# Patient Record
Sex: FEMALE | Race: WHITE | NOT HISPANIC OR LATINO
[De-identification: names, ages, dates, MRNs, and addresses within clinical notes are randomized per-mention and may not be internally consistent; named-entity substitution may affect disease eponyms.]

---

## 2017-10-27 ENCOUNTER — TRANSCRIPTION ENCOUNTER (OUTPATIENT)
Age: 37
End: 2017-10-27

## 2019-12-16 ENCOUNTER — TRANSCRIPTION ENCOUNTER (OUTPATIENT)
Age: 39
End: 2019-12-16

## 2020-12-17 ENCOUNTER — EMERGENCY (EMERGENCY)
Facility: HOSPITAL | Age: 40
LOS: 1 days | End: 2020-12-17
Payer: COMMERCIAL

## 2020-12-17 VITALS
DIASTOLIC BLOOD PRESSURE: 94 MMHG | HEIGHT: 62 IN | OXYGEN SATURATION: 98 % | WEIGHT: 130.07 LBS | SYSTOLIC BLOOD PRESSURE: 135 MMHG | RESPIRATION RATE: 18 BRPM | TEMPERATURE: 98 F | HEART RATE: 96 BPM

## 2020-12-17 LAB — SARS-COV-2 RNA SPEC QL NAA+PROBE: DETECTED

## 2020-12-17 PROCEDURE — 99283 EMERGENCY DEPT VISIT LOW MDM: CPT

## 2020-12-17 PROCEDURE — U0003: CPT

## 2020-12-21 NOTE — ED PROVIDER NOTE - PATIENT PORTAL LINK FT
You can access the FollowMyHealth Patient Portal offered by [facility] by registering at the following website: [email or web address]. By joining [facility]’s FollowMyHealth portal, you will also be able to view your health information using other applications (apps) compatible with our system.

## 2020-12-21 NOTE — ED PROVIDER NOTE - CLINICAL SUMMARY MEDICAL DECISION MAKING FREE TEXT BOX
PT requesting COVID testing   will send COVID swab     PT COVID (and/or RVP) swab(s) obtained.  Advised home quarantine until test results available.  If test positive, requires home quarantine. Anticipatory guidance provided and supportive care recommended. Advised immediate return if worsening symptoms, including and not limited to sever chest pain, worsening shortness of breath, fever not reduced with OTC antipyretic use, inability to tolerate food or liquid.

## 2021-06-29 ENCOUNTER — TRANSCRIPTION ENCOUNTER (OUTPATIENT)
Age: 41
End: 2021-06-29

## 2021-08-03 ENCOUNTER — TRANSCRIPTION ENCOUNTER (OUTPATIENT)
Age: 41
End: 2021-08-03

## 2023-06-07 ENCOUNTER — NON-APPOINTMENT (OUTPATIENT)
Age: 43
End: 2023-06-07

## 2023-06-09 PROBLEM — Z00.00 ENCOUNTER FOR PREVENTIVE HEALTH EXAMINATION: Status: ACTIVE | Noted: 2023-06-09

## 2023-06-28 ENCOUNTER — APPOINTMENT (OUTPATIENT)
Dept: NEUROSURGERY | Facility: CLINIC | Age: 43
End: 2023-06-28
Payer: COMMERCIAL

## 2023-06-28 ENCOUNTER — NON-APPOINTMENT (OUTPATIENT)
Age: 43
End: 2023-06-28

## 2023-06-28 DIAGNOSIS — G93.89 OTHER SPECIFIED DISORDERS OF BRAIN: ICD-10-CM

## 2023-06-28 PROCEDURE — 99203 OFFICE O/P NEW LOW 30 MIN: CPT

## 2023-06-28 NOTE — REASON FOR VISIT
[Second Opinion] : a second opinion [Referred By: _________] : Patient was referred by [facility] [Spouse] : spouse

## 2023-06-29 PROBLEM — G93.89 BRAIN MASS: Status: ACTIVE | Noted: 2023-06-29

## 2023-06-29 NOTE — HISTORY OF PRESENT ILLNESS
[FreeTextEntry1] : newly found lesion  [de-identified] : Mrs. Aparna Jay is a 43 year old left handed female with a past medical history of  anxiety and depression, she reports daily headaches and severe migraine prior to her menstrual cycle with associated photophobia and occasional blurred vision she went to her PCP in February, an MRI was ordered which revealed a brain lesion.  She went to a neurosurgeon Dr. Matt who advised her to continue to monitor with repeat MRI.  Today she is here for a second opinion. \par \par Today she reports feeling well, working full time as an , driving and performing all activities independently. Her headaches are currently well managed with Ubrelvy and Topmax. \par \par Neuro Exam\par Intact

## 2023-06-29 NOTE — PHYSICAL EXAM
[General Appearance - Alert] : alert [General Appearance - In No Acute Distress] : in no acute distress [General Appearance - Well Nourished] : well nourished [Oriented To Time, Place, And Person] : oriented to person, place, and time [Impaired Insight] : insight and judgment were intact [Affect] : the affect was normal [Person] : oriented to person [Place] : oriented to place [Time] : oriented to time [Motor Strength] : muscle strength was normal in all four extremities [No Muscle Atrophy] : normal bulk in all four extremities [Motor Handedness Left-Handed] : the patient is left hand dominant [Sensation Tactile Decrease] : light touch was intact [Balance] : balance was intact [PERRL With Normal Accommodation] : pupils were equal in size, round, reactive to light, with normal accommodation [Extraocular Movements] : extraocular movements were intact [Outer Ear] : the ears and nose were normal in appearance [] : no respiratory distress [Abnormal Walk] : normal gait [Involuntary Movements] : no involuntary movements were seen [Motor Tone] : muscle strength and tone were normal [Romberg's Sign] : Romberg's sign was negtive

## 2023-06-29 NOTE — ASSESSMENT
[FreeTextEntry1] : Discussion \par Brain mass is an incidental finding and not related to headaches \par No neurosurgical interventions needed \par Repeat MRI  in 6 months with T2  flair with coronal images without contrast followed by office visit \par Contact office with any questions or concerns in the interim \par \par .IGurpreet evaluated the patient with the nurse practitioner Joann Barthelemy and established the plan of care. I personally discuss this patient with the nurse practitioner at the time of the visit. I agree with the assessment and plan as written, unless noted below. \par \par

## 2023-09-15 ENCOUNTER — NON-APPOINTMENT (OUTPATIENT)
Age: 43
End: 2023-09-15

## 2023-12-27 ENCOUNTER — APPOINTMENT (OUTPATIENT)
Dept: MRI IMAGING | Facility: IMAGING CENTER | Age: 43
End: 2023-12-27

## 2023-12-27 ENCOUNTER — APPOINTMENT (OUTPATIENT)
Dept: NEUROSURGERY | Facility: CLINIC | Age: 43
End: 2023-12-27

## 2024-06-07 ENCOUNTER — NON-APPOINTMENT (OUTPATIENT)
Age: 44
End: 2024-06-07